# Patient Record
Sex: FEMALE | Race: WHITE | HISPANIC OR LATINO | URBAN - METROPOLITAN AREA
[De-identification: names, ages, dates, MRNs, and addresses within clinical notes are randomized per-mention and may not be internally consistent; named-entity substitution may affect disease eponyms.]

---

## 2022-04-05 ENCOUNTER — APPOINTMENT (RX ONLY)
Dept: URBAN - METROPOLITAN AREA CLINIC 15 | Facility: CLINIC | Age: 32
Setting detail: DERMATOLOGY
End: 2022-04-05

## 2022-04-05 VITALS — WEIGHT: 143 LBS | HEIGHT: 60 IN

## 2022-04-05 DIAGNOSIS — Z41.9 ENCOUNTER FOR PROCEDURE FOR PURPOSES OTHER THAN REMEDYING HEALTH STATE, UNSPECIFIED: ICD-10-CM

## 2022-04-05 PROCEDURE — ? BOTOX

## 2022-04-05 PROCEDURE — ? KYBELLA INJECTION

## 2022-04-05 PROCEDURE — ? COSMETIC CONSULTATION - KYBELLA

## 2022-04-05 PROCEDURE — ? COSMETIC CONSULTATION: BOTULINUM TOXIN

## 2022-04-05 ASSESSMENT — LOCATION SIMPLE DESCRIPTION DERM
LOCATION SIMPLE: LEFT FOREHEAD
LOCATION SIMPLE: SUBMENTAL CHIN
LOCATION SIMPLE: GLABELLA
LOCATION SIMPLE: RIGHT FOREHEAD
LOCATION SIMPLE: LEFT CHEEK
LOCATION SIMPLE: RIGHT TEMPLE
LOCATION SIMPLE: INFERIOR FOREHEAD

## 2022-04-05 ASSESSMENT — LOCATION DETAILED DESCRIPTION DERM
LOCATION DETAILED: RIGHT INFERIOR LATERAL FOREHEAD
LOCATION DETAILED: SUBMENTAL CHIN
LOCATION DETAILED: INFERIOR MID FOREHEAD
LOCATION DETAILED: LEFT INFERIOR LATERAL FOREHEAD
LOCATION DETAILED: LEFT SUPERIOR CENTRAL MALAR CHEEK
LOCATION DETAILED: RIGHT INFERIOR TEMPLE
LOCATION DETAILED: GLABELLA

## 2022-04-05 ASSESSMENT — LOCATION ZONE DERM: LOCATION ZONE: FACE

## 2022-04-05 NOTE — PROCEDURE: BOTOX
Additional Area 2 Location: masseters
Show Levator Superior Units: Yes
Depressor Anguli Oris Units: 0
Detail Level: Simple
Post-Care Instructions: Patient instructed to not lie down for 4 hours and limit physical activity for 24 hours. Patient instructed not to travel by airplane for 48 hours.
Comments: Box 44/2 30 units Box 48/2 8 units for a total of 38 units of botox
Show Right And Left Periorbital Units: No
Forehead Units: 9
Glabellar Complex Units: [address] [id number]
Show Inventory Tab: Hide
Patient Specific Comments (Will Not Stick From Patient To Patient): Do NOT massage or apply pressure on the treated area for 6-8hrs after treatment since Botox may migrate to areas of undesirable effectiveness. \\nDo NOT lie down for 6 hours after treatment. Sancho Adler ..\\nYou MUST keep your head UPRIGHT for at least 6 hrs after injection.
Lot #: [id number]
Expiration Date (Month Year): 5/2024
Additional Area 1 Location: axillary
Price (Use Numbers Only, No Special Characters Or $): iHna 354
Dilution (U/0.1 Cc): 4
Consent: Written consent obtained. Risks include but not limited to lid/brow ptosis, bruising, swelling, diplopia, temporary effect, incomplete chemical denervation.

## 2022-04-05 NOTE — HPI: COSMETIC CONSULTATION
Additional History: Patient is in office for consult on botox/ Dysport for 3 upper areas or recommended areas, filler on the under eyes if Botox/ Dysport does not improve the under eye area and Kybella to under chin.

## 2022-04-05 NOTE — PROCEDURE: KYBELLA INJECTION
Price (Use Numbers Only, No Special Characters Or $): [address] [facility]
Show Inventory Tab: Hide
Detail Level: Zone
Kybella Volume In Cc (Won't Render If 0): 0
Lot #: [id number]
Patient Reported Weight (Include Units): [address]
Procedure Text: The treatment areas were cleansed. Vivian Manolo was administered using the parameters mentioned above.
Consent: Written consent obtained. Risks include but not limited to bruising, beading, irregular texture, ulceration, infection, allergic reaction, scar formation, incomplete augmentation, temporary nature, procedural pain.
Vials Used (Won't Render If 0 - Required If Using Inventory): 1
Treatment Area: Submental fat.
Post-Care Instructions: Patient instructed to apply ice to reduce swelling.
Expiration Date (Month Year): PPD8720
Topical Anesthesia?: 20% benzocaine, 8% lidocaine, 4% tetracaine

## 2022-04-05 NOTE — PROCEDURE: COSMETIC CONSULTATION - KYBELLA
# Of Treatments Recommended (Won't Render If 0): 2
Price (Use Numbers Only, No Special Characters Or $): 0
Detail Level: Detailed